# Patient Record
Sex: MALE | Race: WHITE | NOT HISPANIC OR LATINO | Employment: FULL TIME | URBAN - METROPOLITAN AREA
[De-identification: names, ages, dates, MRNs, and addresses within clinical notes are randomized per-mention and may not be internally consistent; named-entity substitution may affect disease eponyms.]

---

## 2019-11-23 ENCOUNTER — HOSPITAL ENCOUNTER (EMERGENCY)
Facility: CLINIC | Age: 39
Discharge: HOME | End: 2019-11-23
Attending: EMERGENCY MEDICINE | Admitting: EMERGENCY MEDICINE
Payer: COMMERCIAL

## 2019-11-23 VITALS
HEART RATE: 67 BPM | OXYGEN SATURATION: 99 % | SYSTOLIC BLOOD PRESSURE: 134 MMHG | RESPIRATION RATE: 18 BRPM | TEMPERATURE: 97 F | DIASTOLIC BLOOD PRESSURE: 70 MMHG

## 2019-11-23 DIAGNOSIS — J20.9 ACUTE BRONCHITIS, UNSPECIFIED ORGANISM: ICD-10-CM

## 2019-11-23 PROCEDURE — 99282 EMERGENCY DEPT VISIT SF MDM: CPT

## 2019-11-23 RX ORDER — DOXYCYCLINE 100 MG/1
100 CAPSULE ORAL 2 TIMES DAILY
Qty: 14 CAPSULE | Refills: 0 | Status: SHIPPED | OUTPATIENT
Start: 2019-11-23 | End: 2019-11-30

## 2019-11-23 NOTE — ED NOTES
Pt provided with discharge paperwork and educated on recommended follow-up with PCP. Pt educated on how to take antibiotics appropriately at home. Pt voiced understanding and denied any questions at discharge.

## 2019-11-23 NOTE — ED AVS SNAPSHOT
[facility] Emergency Department  [address]  [facility] [address]  Phone:  [phone]  Fax:  [phone]                                    Franklyn Velez   MRN: [id number]    Department:  [facility] Emergency Department   Date of Visit:  11/23/2019           After Visit Summary Signature Page    I have received my discharge instructions, and my questions have been answered. I have discussed any challenges I see with this plan with the nurse or doctor.    ..........................................................................................................................................  Patient/Patient Representative Signature      ..........................................................................................................................................  Patient Representative Print Name and Relationship to Patient    ..................................................               ................................................  Date                                   Time    ..........................................................................................................................................  Reviewed by Signature/Title    ...................................................              ..............................................  Date                                               Time          22EPIC Rev 08/18

## 2019-11-23 NOTE — ED TRIAGE NOTES
Cough x 2 weeks.  Diagnosed with bronchitis Wednesday.  Started on albuterol inhaler and steroid.  Feels worse today.

## 2019-11-23 NOTE — ED PROVIDER NOTES
History     Chief Complaint:  Cough    The history is provided by the patient.      Franklyn Velez is a 39 year old otherwise healthy male who presents alone for evaluation of worsening cough over the last 1.5 weeks. Patient states that he initially had cold symptoms two weeks ago and most of symptoms resolved, but cough lingered. The cough continued to worsen, so patient was seen by the nurse at his work, give a Duoneb and was diagnosed with bronchitis three days ago and started on an Albuterol inhaler and Predisone, which he endorses he has been compliant with, but due to worsening cough, he was prompted to present.     Here, patient endorses that he has been unable to sleep or lay flat secondary to the cough. He notes some improvement with NyQuil, but denies any fever, sore throat or ear pain.     Allergies:  Ceclor  Sulfa drugs     Medications:    Albuterol inhaler  Prednisone - day 3    Past Medical History:    History reviewed. No pertinent past medical history.     Past Surgical History:    History reviewed. No pertinent past surgical history.     Family History:    History reviewed. No pertinent family history.      Social History:  The patient was accompanied to the ED alone.  Smoking Status: No  Alcohol Use: Yes  Drug Use: No        Review of Systems   All other systems reviewed and are negative.    Physical Exam     Patient Vitals for the past 24 hrs:   BP Temp Temp src Pulse Resp SpO2   11/23/19 0059 134/70 97  F (36.1  C) Temporal 67 18 99 %       Physical Exam  Nursing note and vitals reviewed.  Constitutional: Cooperative. Sitting up comfortably  HENT:   Mouth/Throat: Mucous membranes are normal.   Cardiovascular: Normal rate, regular rhythm and normal heart sounds.  No murmur.  Pulmonary/Chest: Effort normal and breath sounds normal. No respiratory distress. No wheezes. No rales.   Abdominal: Soft. Normal appearance and bowel sounds are normal. No distension. There is no  tenderness.  Neurological: Alert.   Skin: Skin is warm and dry.   Psychiatric: Normal mood and affect.      Emergency Department Course   Emergency Department Course:  Past medical records, nursing notes, and vitals reviewed.    (0210)   I performed an exam of the patient as documented above. History obtained from patient. Discussed physical exam findings and based on history, antibiotics are indicated and as imaging would not change work up, discussed the CT can be deferred at this point and patient is agreeable.     Findings and plan explained to the Patient. Patient discharged home with instructions regarding supportive care, medications, and reasons to return. The importance of close follow-up was reviewed. The patient was prescribed Doxycycline. I personally answered all related questions prior to discharge.     Impression & Plan     Medical Decision Making:  Franklyn Velez is a 39 year old otherwise healthy male who presented to the ED with symptoms consistent with bronchitis including cough for the last 1.5 weeks. The patient appears non-toxic without evidence of respiratory distress. The patient has normal oxygen saturations with normal work of breathing. There are no signs of serious systemic illness and the patient has normal mentation without confusion. As patient is currently on Prednisone and an inhaler and endorsing worsening symptoms, I believe that imaging can be deferred at this point and Doxycycline will be prescribed due to prolonged symptoms. Reviewed with patient that cough and airway hyperreactivity may persist for several weeks. I discussed the need to seek immediate evaluation for respiratory distress, confusion, fever, or for any other questions or concerns. Recommended follow-up with primary care provider in three to five days if not improving. The patient was in agreement with plan and discharged in satisfactory condition with all questions answered.      Discharge Diagnosis:     ICD-10-CM    1. Acute bronchitis, unspecified organism J20.9        Disposition:  Discharged to home.    Discharge Medications:  New Prescriptions    DOXYCYCLINE HYCLATE (VIBRAMYCIN) 100 MG CAPSULE    Take 1 capsule (100 mg) by mouth 2 times daily for 7 days       Scribe Disclosure:  IJen, am serving as a scribe at 2:02 AM on 11/23/2019 to document services personally performed by Niranjan Jang MD based on my observations and the provider's statements to me.  11/23/2019   [facility] EMERGENCY DEPARTMENT       Niranjan Jang MD  11/23/19 0219

## 2020-02-24 ENCOUNTER — HEALTH MAINTENANCE LETTER (OUTPATIENT)
Age: 40
End: 2020-02-24

## 2020-12-13 ENCOUNTER — HEALTH MAINTENANCE LETTER (OUTPATIENT)
Age: 40
End: 2020-12-13

## 2021-04-17 ENCOUNTER — HEALTH MAINTENANCE LETTER (OUTPATIENT)
Age: 41
End: 2021-04-17

## 2021-05-25 ENCOUNTER — HOSPITAL ENCOUNTER (EMERGENCY)
Facility: CLINIC | Age: 41
Discharge: HOME | End: 2021-05-25
Attending: PHYSICIAN ASSISTANT | Admitting: PHYSICIAN ASSISTANT
Payer: COMMERCIAL

## 2021-05-25 VITALS
OXYGEN SATURATION: 100 % | RESPIRATION RATE: 14 BRPM | WEIGHT: 209 LBS | HEART RATE: 70 BPM | SYSTOLIC BLOOD PRESSURE: 128 MMHG | TEMPERATURE: 97 F | DIASTOLIC BLOOD PRESSURE: 68 MMHG

## 2021-05-25 DIAGNOSIS — S51.812A LACERATION OF LEFT FOREARM, INITIAL ENCOUNTER: ICD-10-CM
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
## 2021-05-25 PROCEDURE — 250N000009 HC RX 250: Performed by: PHYSICIAN ASSISTANT

## 2021-05-25 PROCEDURE — 90471 IMMUNIZATION ADMIN: CPT | Performed by: PHYSICIAN ASSISTANT

## 2021-05-25 PROCEDURE — 99283 EMERGENCY DEPT VISIT LOW MDM: CPT | Mod: 25

## 2021-05-25 PROCEDURE — 12002 RPR S/N/AX/GEN/TRNK2.6-7.5CM: CPT

## 2021-05-25 PROCEDURE — 90715 TDAP VACCINE 7 YRS/> IM: CPT | Performed by: PHYSICIAN ASSISTANT

## 2021-05-25 PROCEDURE — 250N000011 HC RX IP 250 OP 636: Performed by: PHYSICIAN ASSISTANT

## 2021-05-25 PROCEDURE — 250N000013 HC RX MED GY IP 250 OP 250 PS 637: Performed by: PHYSICIAN ASSISTANT

## 2021-05-25 RX ADMIN — CLOSTRIDIUM TETANI TOXOID ANTIGEN (FORMALDEHYDE INACTIVATED), CORYNEBACTERIUM DIPHTHERIAE TOXOID ANTIGEN (FORMALDEHYDE INACTIVATED), BORDETELLA PERTUSSIS TOXOID ANTIGEN (GLUTARALDEHYDE INACTIVATED), BORDETELLA PERTUSSIS FILAMENTOUS HEMAGGLUTININ ANTIGEN (FORMALDEHYDE INACTIVATED), BORDETELLA PERTUSSIS PERTACTIN ANTIGEN, AND BORDETELLA PERTUSSIS FIMBRIAE 2/3 ANTIGEN 0.5 ML: 5; 2; 2.5; 5; 3; 5 INJECTION, SUSPENSION INTRAMUSCULAR at 18:10

## 2021-05-25 RX ADMIN — Medication 3 ML: at 16:20

## 2021-05-25 RX ADMIN — AMOXICILLIN AND CLAVULANATE POTASSIUM 1 TABLET: 875; 125 TABLET, FILM COATED ORAL at 18:09

## 2021-05-25 ASSESSMENT — ENCOUNTER SYMPTOMS
NERVOUS/ANXIOUS: 1
WOUND: 1

## 2021-05-25 NOTE — ED PROVIDER NOTES
History   Chief Complaint:  Dog Bite    The history is provided by the patient.     Franklyn Velez is a 40 year old male who presents for evaluation of a dog bite to this left forearm. Around noon today, his two dogs started fighting each other. When he attempted to separate the two, he was bitten in the forearm. The extent of the bite prompted him to present for evaluation. He denies any other injuries or wounds from the event. He reports the dog is fully vaccinated, however he does not think his tetanus is up-to-date. 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  He is able to move the hand and fingers normally and denies loss of sensation.    Allergies:  Cefaclor  Cephalosporins  Sulfa Drugs    Medications:    The patient is currently on no regular medications.    Past Medical History:    Acid reflux     Past Surgical History:    Vasectomy     Family History:    He denies past family history.     Social History:  Presents unaccompanied to the ED.   The dog is his own.     Review of Systems   Skin: Positive for wound.   Psychiatric/Behavioral: The patient is nervous/anxious.    All other systems reviewed and are negative.    Physical Exam     Patient Vitals for the past 24 hrs:   BP Temp Temp src Pulse Resp SpO2 Weight   05/25/21 1303 133/69 97  F (36.1  C) Temporal 76 16 100 % 94.8 kg (209 lb)      Physical Exam  General: Alert and oriented.    Head: Normocephalic.  External ears and nose normal.    Eyes: Pupils equal and round.  Normal tracking.    Pulmonary/Chest: Effort and rate normal    MSK: Normal ROM in elbow, wrist, hand, fingers.    SKIN: 5 cm subcutaneous laceration over the flexor surface of the left forearm.  No foreign bodies or exposed tendon, bone, or bleeding vessels.  There is another very small puncture wound adjacent to the area of injury with no foreign body, bleeding, or swelling.  Warm and dry with strong radial pulse  and normal capillary refill.    NEURO:  Normal strength of flexion and extension at wrist, MCP, PIP, and DIP joints.  Normal sensation through the radial/ulnar/median nerve distributions.    PSYCH:  Normal affect    Emergency Department Course     Procedures    Laceration Repair        LACERATION:  A subcutaneous minimally Contaminated 5 cm laceration.      LOCATION:  Left forearm      FUNCTION:  Distally sensation, circulation, motor and tendon function are intact.      ANESTHESIA:  LET - Topical      PREPARATION:  Irrigation and Scrubbing with Normal Saline and Shur Clens      DEBRIDEMENT:  no debridement and wound explored, no foreign body found      CLOSURE:  Wound was closed with One Layer.  Skin closed with 6 x 4.0 Ethylon using interrupted sutures.    Emergency Department Course:    Reviewed:  I reviewed the patient's nursing notes, vitals, past medical records, and Care Everywhere.     Assessments:  1707: I performed an exam of the patient, as documented above. History obtained and plan for ED work up discussed as well.   1742: I reassessed the patient after the wound was cleaned and repaired it, as documented above. Wound care instructions discussed, as well as plan for discharge.     Interventions:  1620: LET, 3 mL, Topical   1809: Augmentin, 1 tablet, PO  1810: Tdap, 0.5 mL, IM     Disposition:  The patient was discharged to home.     Impression & Plan      Medical Decision Making:   Franklyn Velez is a 40 year old male who presents with laceration to arm after dog bite.  Patient history and records reviewed.  On examination, the patient has a laceration, but is otherwise well appearing.  There is no evidence of neurovascular compromise, fracture, imbedded foreign body, or tendon injury.  Wound was anesthetized, irrigated, explored, and closed as above with non-absorbale sutures after discussion of risks versus benefits associated with infection.  Tetanus checked and update.  Given dog bite with  closure will start on Augmentin for prophylaxis.  No indication for rabies series as it is his dog and it is fully immunized.  Discussed indications to return to ED if new or worsening symptoms, or signs of infection such as fever, swelling, spreading erythema, drainage, or increasing pain.  Advised sun protection to reduce scarring.  Follow-up with primary care provider in 10 days for suture removal.      Diagnosis:     ICD-10-CM    1. Laceration of left forearm, initial encounter  S51.812A    2. Dog bite, initial encounter  W54.0XXA        Discharge Medications:  New Prescriptions    AMOXICILLIN-CLAVULANATE (AUGMENTIN) 875-125 MG TABLET    Take 1 tablet by mouth 2 times daily for 5 days      Scribe Disclosure:  I, Sunshine Yoo, am serving as a scribe on 5/25/2021 at 5:07 PM to personally document services performed by Magdiel Bassett PA-C, based on my observations and the provider's statements to me.      5/25/2021   EMERGENCY DEPARTMENT     Magdiel Bassett PA-C  05/25/21 2038

## 2021-05-25 NOTE — ED TRIAGE NOTES
Patient reports he tried to break up a fight between his 2 dogs and was bitten on left forearm. Bleeding controlled. His dogs vaccines are all up to date per patient. Unsure of his last tetanus.

## 2021-06-04 ENCOUNTER — ALLIED HEALTH/NURSE VISIT (OUTPATIENT)
Dept: PEDIATRICS | Facility: CLINIC | Age: 41
End: 2021-06-04
Payer: COMMERCIAL

## 2021-06-04 DIAGNOSIS — Z48.02 ENCOUNTER FOR REMOVAL OF SUTURES: Primary | ICD-10-CM

## 2021-06-04 PROCEDURE — 99207 PR NO CHARGE NURSE ONLY: CPT

## 2021-06-04 NOTE — PROGRESS NOTES
Franklyn Velez presents to the clinic for removal of sutures and sutures,staples, steri strips. The patient has had sutures in place for 10 days. There has been no patient reported signs or symptoms of infection or drainage. 6  sutures and sutures,staples, staple, steri strips are seen and located on the left forearm. Tetanus status is up to date. All sutures and sutures,staples, steri strips were easily removed today. Routine wound care discussed by the RN or provider. The patient will follow up as needed.    Marielena Flores RN on 6/4/2021 at 3:19 PM

## 2021-09-26 ENCOUNTER — HEALTH MAINTENANCE LETTER (OUTPATIENT)
Age: 41
End: 2021-09-26

## 2021-11-17 ENCOUNTER — IMMUNIZATION (OUTPATIENT)
Dept: NURSING | Facility: CLINIC | Age: 41
End: 2021-11-17
Payer: COMMERCIAL

## 2021-11-17 PROCEDURE — 0064A COVID-19,PF,MODERNA (18+ YRS BOOSTER .25ML): CPT

## 2021-11-17 PROCEDURE — 91306 COVID-19,PF,MODERNA (18+ YRS BOOSTER .25ML): CPT

## 2022-05-08 ENCOUNTER — HEALTH MAINTENANCE LETTER (OUTPATIENT)
Age: 42
End: 2022-05-08

## 2023-01-14 ENCOUNTER — HEALTH MAINTENANCE LETTER (OUTPATIENT)
Age: 43
End: 2023-01-14

## 2023-06-02 ENCOUNTER — HEALTH MAINTENANCE LETTER (OUTPATIENT)
Age: 43
End: 2023-06-02

## 2024-06-30 ENCOUNTER — HEALTH MAINTENANCE LETTER (OUTPATIENT)
Age: 44
End: 2024-06-30